# Patient Record
Sex: FEMALE | Race: BLACK OR AFRICAN AMERICAN | NOT HISPANIC OR LATINO | URBAN - METROPOLITAN AREA
[De-identification: names, ages, dates, MRNs, and addresses within clinical notes are randomized per-mention and may not be internally consistent; named-entity substitution may affect disease eponyms.]

---

## 2018-11-09 ENCOUNTER — TELEPHONE (OUTPATIENT)
Dept: TRANSPLANT | Facility: CLINIC | Age: 68
End: 2018-11-09

## 2018-11-09 NOTE — TELEPHONE ENCOUNTER
----- Message from Hayden Declan sent at 11/9/2018 12:17 PM CST -----  Contact: Patient  Returned call to pt, but there was no answer. LVM telling her that we need updated [facility] recs to nam her appt  ----- Message -----  From: Deanna Davies  Sent: 11/9/2018  12:12 PM  To: Hayden Manriquez    Please call pt, never seen in hep. ??? Records.    ----- Message -----  From: Christine Wing MA  Sent: 11/9/2018  11:52 AM  To: Hayden Hess    Should I handle this as a new Pt & do she need a new referral?  ----- Message -----  From: Kim Hutchison  Sent: 11/9/2018   9:56 AM  To: Christine Wing MA        ----- Message -----  From: Mitali Martini  Sent: 11/9/2018   8:58 AM  To: Hepatology Scheduling    Patient Requesting Appointment.     Reason for appt.: schedule appointment     When is the first available appointment? n/a    Communication Preference: [phone]    Additional Information: n/a

## 2018-11-14 ENCOUNTER — TELEPHONE (OUTPATIENT)
Dept: TRANSPLANT | Facility: CLINIC | Age: 68
End: 2018-11-14

## 2018-11-14 NOTE — TELEPHONE ENCOUNTER
----- Message from Hayden Manriquez sent at 11/14/2018 12:33 PM CST -----  Contact: Yovana    Returned call to Md office and sp with Yovana about pt ref. She told me that they there are no post liver labs, or any txp meds listed on her medical history. They also told me that they do not know where the pt had her txp at. Told them I will call the pt to obt ain missing info because we need to know who is prescribing her post txp meds and where she had her txp done before we can nam.    Called pt, but there was no answer. LVM that she will need to call back to obtain missing info  ----- Message -----  From: Vaishnavi Crouch MA  Sent: 11/14/2018   9:17 AM  To: Hayden Manriquez        ----- Message -----  From: Naya Heller  Sent: 11/14/2018   9:01 AM  To: [facility] Staff    Yovana was calling to schedule a NP appt.    Yovana would like a call back at [phone].    Thank You.

## 2018-12-20 ENCOUNTER — TELEPHONE (OUTPATIENT)
Dept: HEPATOLOGY | Facility: CLINIC | Age: 68
End: 2018-12-20

## 2018-12-20 NOTE — TELEPHONE ENCOUNTER
----- Message from Virginia Mcdaniel RN sent at 12/19/2018  6:17 PM CST -----  Contact: Yovana ozuna/ Dr. Santana's Office  Patient has not been seen here, she rescheduled from 2016    ----- Message -----  From: Sherwin Iniguez  Sent: 12/19/2018   2:02 PM  To: Hepatology Scheduling    Needs Advice    Reason for call: Calling to schedule appt for pt        Communication Preference: [phone]    Additional Information: N/A

## 2018-12-20 NOTE — TELEPHONE ENCOUNTER
MA called patient to schedule her appt she said that she is in a fixed income she cannot come right now. She know what is going on with her liver. When she is ready to schedule she will give us a callback.